# Patient Record
Sex: FEMALE | Race: WHITE | NOT HISPANIC OR LATINO | Employment: FULL TIME | URBAN - METROPOLITAN AREA
[De-identification: names, ages, dates, MRNs, and addresses within clinical notes are randomized per-mention and may not be internally consistent; named-entity substitution may affect disease eponyms.]

---

## 2019-06-12 ENCOUNTER — OFFICE VISIT (OUTPATIENT)
Dept: FAMILY MEDICINE CLINIC | Facility: CLINIC | Age: 19
End: 2019-06-12

## 2019-06-12 VITALS
RESPIRATION RATE: 18 BRPM | HEIGHT: 67 IN | SYSTOLIC BLOOD PRESSURE: 140 MMHG | OXYGEN SATURATION: 98 % | WEIGHT: 280.4 LBS | DIASTOLIC BLOOD PRESSURE: 90 MMHG | HEART RATE: 85 BPM | TEMPERATURE: 96.7 F | BODY MASS INDEX: 44.01 KG/M2

## 2019-06-12 DIAGNOSIS — Z00.00 ENCOUNTER FOR MEDICAL EXAMINATION TO ESTABLISH CARE: Primary | ICD-10-CM

## 2019-06-12 DIAGNOSIS — R63.5 WEIGHT GAIN: ICD-10-CM

## 2019-06-12 DIAGNOSIS — E28.2 PCOS (POLYCYSTIC OVARIAN SYNDROME): ICD-10-CM

## 2019-06-12 DIAGNOSIS — J03.90 TONSILLITIS: ICD-10-CM

## 2019-06-12 DIAGNOSIS — J30.2 SEASONAL ALLERGIES: ICD-10-CM

## 2019-06-12 PROBLEM — E66.01 MORBID OBESITY WITH BMI OF 40.0-44.9, ADULT (HCC): Status: ACTIVE | Noted: 2019-06-12

## 2019-06-12 PROBLEM — I10 ESSENTIAL HYPERTENSION: Status: ACTIVE | Noted: 2019-06-12

## 2019-06-12 LAB
HBA1C MFR BLD: 5.5 %
Lab: ABNORMAL

## 2019-06-12 PROCEDURE — 83036 HEMOGLOBIN GLYCOSYLATED A1C: CPT | Performed by: NURSE PRACTITIONER

## 2019-06-12 PROCEDURE — 99204 OFFICE O/P NEW MOD 45 MIN: CPT | Performed by: NURSE PRACTITIONER

## 2019-06-12 RX ORDER — DESOGESTREL AND ETHINYL ESTRADIOL 0.15-0.03
KIT ORAL
Refills: 12 | COMMUNITY
Start: 2019-03-25 | End: 2021-06-25

## 2019-06-12 RX ORDER — CETIRIZINE HYDROCHLORIDE 10 MG/1
10 TABLET ORAL DAILY
Qty: 30 TABLET | Refills: 5 | OUTPATIENT
Start: 2019-06-12 | End: 2021-06-25

## 2019-06-12 RX ORDER — DOXYCYCLINE HYCLATE 100 MG
100 TABLET ORAL 2 TIMES DAILY
Qty: 20 TABLET | Refills: 0 | Status: SHIPPED | OUTPATIENT
Start: 2019-06-12 | End: 2019-06-22

## 2019-06-12 NOTE — PROGRESS NOTES
Britta Taylor is a 19 y.o. female who presents today to establish care and evaluate uri symptoms.    Chief Complaint   Patient presents with   • Establish Care       Patient states that she has had uri symptoms for over a week. She has a sore throat, sinus congestion/drainage, ear pain, cough, and fever up to 101. She has taken OTC meds such as Airborne and Dayquil with minimal relief. She also has PCOS which is not really being treated except at [facility] where she gets her OCP's. She is sexually active and in a monogamous relationship with a long term boyfriend. She does like the APRI and states that it has helped regulate her menses and cut down on the pain and cramping. She notes her BP is elevated today and it has been elevated in the past as well. She has never taken any medication for hypertension but has gained weight over the past couple of years.      The following portions of the patient's history were reviewed and updated as appropriate: allergies, current medications, past family history, past medical history, past social history, past surgical history and problem list.     Past Medical History:   Diagnosis Date   • PCOS (polycystic ovarian syndrome)        History reviewed. No pertinent surgical history.    History reviewed. No pertinent family history.    Social History     Socioeconomic History   • Marital status: Single     Spouse name: Not on file   • Number of children: Not on file   • Years of education: Not on file   • Highest education level: Not on file   Tobacco Use   • Smoking status: Never Smoker   • Smokeless tobacco: Never Used   Substance and Sexual Activity   • Alcohol use: No     Frequency: Never   • Drug use: No   • Sexual activity: Yes     Partners: Male     Birth control/protection: OCP       Allergies   Allergen Reactions   • Amoxicillin Rash         Current Outpatient Medications:   •  APRI 0.15-30 MG-MCG per tablet, , Disp: , Rfl: 12  •  cetirizine (zyrTEC) 10 MG tablet,  "Take 1 tablet by mouth Daily., Disp: 30 tablet, Rfl: 5  •  doxycycline (VIBRAMYICN) 100 MG tablet, Take 1 tablet by mouth 2 (Two) Times a Day for 10 days., Disp: 20 tablet, Rfl: 0    Health Maintenance   Topic Date Due   • ANNUAL PHYSICAL  01/19/2002   • HPV VACCINES (1 - Female 3-dose series) 01/19/2014   • MENINGOCOCCAL VACCINE (Normal Risk) (1 - 2-dose series) 01/19/2015   • TDAP/TD VACCINES (1 - Tdap) 01/19/2018   • CHLAMYDIA SCREENING  06/12/2019   • INFLUENZA VACCINE  08/01/2019        ROS    Review of Systems   Constitutional: Positive for unexpected weight gain.   HENT: Positive for congestion, ear pain, postnasal drip, rhinorrhea and sore throat.    Respiratory: Positive for cough.    All other systems reviewed and are negative.      Visit Vitals  /90   Pulse 85   Temp 96.7 °F (35.9 °C) (Temporal)   Resp 18   Ht 170.2 cm (67\")   Wt 127 kg (280 lb 6.4 oz)   LMP 06/03/2019   SpO2 98%   BMI 43.92 kg/m²       Physical Exam     Physical Exam   Constitutional: She is oriented to person, place, and time. She appears well-developed. She is morbidly obese.  HENT:   Head: Normocephalic and atraumatic.   Right Ear: A middle ear effusion is present.   Left Ear: A middle ear effusion is present.   Nose: Mucosal edema, rhinorrhea and congestion present.   Mouth/Throat: Posterior oropharyngeal edema and posterior oropharyngeal erythema present. Tonsils are 2+ on the right. Tonsils are 2+ on the left.   Cardiovascular: Normal rate, regular rhythm and normal heart sounds. Exam reveals no gallop and no friction rub.   No murmur heard.  Pulmonary/Chest: Effort normal and breath sounds normal. No stridor. No respiratory distress. She has no wheezes. She has no rales.   Neurological: She is alert and oriented to person, place, and time.   Skin: Skin is warm and dry.   Psychiatric: She has a normal mood and affect. Her behavior is normal. Judgment and thought content normal.   Nursing note and vitals " reviewed.      Assessment/Plan    Problem List Items Addressed This Visit        Endocrine    PCOS (polycystic ovarian syndrome)    Relevant Orders    CBC Auto Differential    Comprehensive Metabolic Panel    TSH    Lipid Panel With / Chol / HDL Ratio    Vitamin D 1,25 Dihydroxy    Vitamin B12       Other    Seasonal allergies    Relevant Medications    cetirizine (zyrTEC) 10 MG tablet      Other Visit Diagnoses     Encounter for medical examination to establish care    -  Primary    Tonsillitis        Relevant Medications    doxycycline (VIBRAMYICN) 100 MG tablet    Weight gain        Relevant Orders    POC Glycosylated Hemoglobin (Hb A1C) (Completed)    CBC Auto Differential    Comprehensive Metabolic Panel    TSH    Lipid Panel With / Chol / HDL Ratio    Vitamin D 1,25 Dihydroxy    Vitamin B12        Hemoglobin A1c is 5.5 today.     Patient will have fasting labs drawn at [facility], which is her place of employment, then she will return for a comprehensive physical. We will address the issues of her hypertension and PCOS at that time.     Patient will return in approximately 2 weeks for a complete physical and to review laboratory results.    YAKELIN Dutton

## 2019-06-17 ENCOUNTER — LAB (OUTPATIENT)
Dept: LAB | Facility: HOSPITAL | Age: 19
End: 2019-06-17

## 2019-06-17 DIAGNOSIS — R63.5 WEIGHT GAIN: ICD-10-CM

## 2019-06-17 DIAGNOSIS — E28.2 PCOS (POLYCYSTIC OVARIAN SYNDROME): ICD-10-CM

## 2019-06-17 LAB
ALBUMIN SERPL-MCNC: 4.5 G/DL (ref 3.5–5.2)
ALBUMIN/GLOB SERPL: 1.3 G/DL
ALP SERPL-CCNC: 57 U/L (ref 39–117)
ALT SERPL W P-5'-P-CCNC: 21 U/L (ref 1–33)
ANION GAP SERPL CALCULATED.3IONS-SCNC: 12.8 MMOL/L
AST SERPL-CCNC: 15 U/L (ref 1–32)
BASOPHILS # BLD AUTO: 0.04 10*3/MM3 (ref 0–0.2)
BASOPHILS NFR BLD AUTO: 0.4 % (ref 0–1.5)
BILIRUB SERPL-MCNC: 0.3 MG/DL (ref 0.2–1.2)
BUN BLD-MCNC: 13 MG/DL (ref 6–20)
BUN/CREAT SERPL: 19.4 (ref 7–25)
CALCIUM SPEC-SCNC: 10.5 MG/DL (ref 8.6–10.5)
CHLORIDE SERPL-SCNC: 100 MMOL/L (ref 98–107)
CHOLEST SERPL-MCNC: 220 MG/DL (ref 0–200)
CO2 SERPL-SCNC: 27.2 MMOL/L (ref 22–29)
CREAT BLD-MCNC: 0.67 MG/DL (ref 0.57–1)
DEPRECATED RDW RBC AUTO: 40.3 FL (ref 37–54)
EOSINOPHIL # BLD AUTO: 0.07 10*3/MM3 (ref 0–0.4)
EOSINOPHIL NFR BLD AUTO: 0.7 % (ref 0.3–6.2)
ERYTHROCYTE [DISTWIDTH] IN BLOOD BY AUTOMATED COUNT: 12.3 % (ref 12.3–15.4)
GFR SERPL CREATININE-BSD FRML MDRD: 112 ML/MIN/1.73
GLOBULIN UR ELPH-MCNC: 3.5 GM/DL
GLUCOSE BLD-MCNC: 84 MG/DL (ref 65–99)
HCT VFR BLD AUTO: 43.4 % (ref 34–46.6)
HDLC SERPL QL: 4.07
HDLC SERPL-MCNC: 54 MG/DL (ref 40–60)
HGB BLD-MCNC: 13.6 G/DL (ref 12–15.9)
IMM GRANULOCYTES # BLD AUTO: 0.04 10*3/MM3 (ref 0–0.05)
IMM GRANULOCYTES NFR BLD AUTO: 0.4 % (ref 0–0.5)
LDLC SERPL CALC-MCNC: 141 MG/DL (ref 0–100)
LYMPHOCYTES # BLD AUTO: 3.24 10*3/MM3 (ref 0.7–3.1)
LYMPHOCYTES NFR BLD AUTO: 30.1 % (ref 19.6–45.3)
MCH RBC QN AUTO: 28.2 PG (ref 26.6–33)
MCHC RBC AUTO-ENTMCNC: 31.3 G/DL (ref 31.5–35.7)
MCV RBC AUTO: 89.9 FL (ref 79–97)
MONOCYTES # BLD AUTO: 0.49 10*3/MM3 (ref 0.1–0.9)
MONOCYTES NFR BLD AUTO: 4.6 % (ref 5–12)
NEUTROPHILS # BLD AUTO: 6.87 10*3/MM3 (ref 1.7–7)
NEUTROPHILS NFR BLD AUTO: 63.8 % (ref 42.7–76)
NRBC BLD AUTO-RTO: 0 /100 WBC (ref 0–0.2)
PLATELET # BLD AUTO: 354 10*3/MM3 (ref 140–450)
PMV BLD AUTO: 9.8 FL (ref 6–12)
POTASSIUM BLD-SCNC: 4.3 MMOL/L (ref 3.5–5.2)
PROT SERPL-MCNC: 8 G/DL (ref 6–8.5)
RBC # BLD AUTO: 4.83 10*6/MM3 (ref 3.77–5.28)
SODIUM BLD-SCNC: 140 MMOL/L (ref 136–145)
TRIGL SERPL-MCNC: 123 MG/DL (ref 0–150)
TSH SERPL DL<=0.05 MIU/L-ACNC: 2 MIU/ML (ref 0.27–4.2)
VIT B12 BLD-MCNC: 567 PG/ML (ref 211–946)
VLDLC SERPL-MCNC: 24.6 MG/DL (ref 5–40)
WBC NRBC COR # BLD: 10.75 10*3/MM3 (ref 3.4–10.8)

## 2019-06-17 PROCEDURE — 82607 VITAMIN B-12: CPT

## 2019-06-17 PROCEDURE — 80053 COMPREHEN METABOLIC PANEL: CPT

## 2019-06-17 PROCEDURE — 85025 COMPLETE CBC W/AUTO DIFF WBC: CPT

## 2019-06-17 PROCEDURE — 84443 ASSAY THYROID STIM HORMONE: CPT

## 2019-06-17 PROCEDURE — 80061 LIPID PANEL: CPT

## 2019-06-17 PROCEDURE — 82652 VIT D 1 25-DIHYDROXY: CPT

## 2019-06-17 PROCEDURE — 36415 COLL VENOUS BLD VENIPUNCTURE: CPT

## 2019-06-19 LAB — 1,25(OH)2D3 SERPL-MCNC: 49.6 PG/ML (ref 19.9–79.3)

## 2019-06-26 ENCOUNTER — OFFICE VISIT (OUTPATIENT)
Dept: FAMILY MEDICINE CLINIC | Facility: CLINIC | Age: 19
End: 2019-06-26

## 2019-06-26 VITALS
DIASTOLIC BLOOD PRESSURE: 90 MMHG | SYSTOLIC BLOOD PRESSURE: 130 MMHG | RESPIRATION RATE: 18 BRPM | HEIGHT: 67 IN | TEMPERATURE: 97 F | HEART RATE: 101 BPM | BODY MASS INDEX: 44.04 KG/M2 | OXYGEN SATURATION: 98 % | WEIGHT: 280.6 LBS

## 2019-06-26 DIAGNOSIS — I10 ESSENTIAL HYPERTENSION: ICD-10-CM

## 2019-06-26 DIAGNOSIS — Z00.00 ANNUAL PHYSICAL EXAM: Primary | ICD-10-CM

## 2019-06-26 DIAGNOSIS — Z23 NEED FOR TETANUS BOOSTER: ICD-10-CM

## 2019-06-26 DIAGNOSIS — E28.2 PCOS (POLYCYSTIC OVARIAN SYNDROME): ICD-10-CM

## 2019-06-26 LAB
BILIRUB BLD-MCNC: NEGATIVE MG/DL
CLARITY, POC: CLEAR
COLOR UR: YELLOW
GLUCOSE UR STRIP-MCNC: NEGATIVE MG/DL
KETONES UR QL: ABNORMAL
LEUKOCYTE EST, POC: NEGATIVE
NITRITE UR-MCNC: NEGATIVE MG/ML
PH UR: 5.5 [PH] (ref 5–8)
PROT UR STRIP-MCNC: NEGATIVE MG/DL
RBC # UR STRIP: NEGATIVE /UL
SP GR UR: 1.02 (ref 1–1.03)
UROBILINOGEN UR QL: NORMAL

## 2019-06-26 PROCEDURE — 81003 URINALYSIS AUTO W/O SCOPE: CPT | Performed by: NURSE PRACTITIONER

## 2019-06-26 PROCEDURE — 99395 PREV VISIT EST AGE 18-39: CPT | Performed by: NURSE PRACTITIONER

## 2019-06-26 PROCEDURE — 90471 IMMUNIZATION ADMIN: CPT | Performed by: NURSE PRACTITIONER

## 2019-06-26 PROCEDURE — 90715 TDAP VACCINE 7 YRS/> IM: CPT | Performed by: NURSE PRACTITIONER

## 2019-06-26 RX ORDER — LISINOPRIL 10 MG/1
10 TABLET ORAL DAILY
Qty: 30 TABLET | Refills: 2 | Status: SHIPPED | OUTPATIENT
Start: 2019-06-26 | End: 2019-09-12 | Stop reason: SDUPTHER

## 2019-06-26 NOTE — PROGRESS NOTES
Britta Kerr is a 19 y.o. female who presents for comprehensive examination.     Chief Complaint   Patient presents with   • Annual Exam       HPI     Past Medical History:   Diagnosis Date   • PCOS (polycystic ovarian syndrome)        History reviewed. No pertinent surgical history.    Family History   Problem Relation Age of Onset   • No Known Problems Mother    • Heart attack Father    • Colon cancer Father    • No Known Problems Sister    • No Known Problems Brother    • GI problems Maternal Grandmother         bowel obstruction   • Diabetes Maternal Grandfather    • Heart disease Paternal Grandmother    • Diabetes Paternal Grandfather    • Cancer Paternal Grandfather    • No Known Problems Brother    • No Known Problems Sister        Social History     Socioeconomic History   • Marital status: Single     Spouse name: Not on file   • Number of children: Not on file   • Years of education: Not on file   • Highest education level: Not on file   Tobacco Use   • Smoking status: Never Smoker   • Smokeless tobacco: Never Used   Substance and Sexual Activity   • Alcohol use: No     Frequency: Never   • Drug use: No   • Sexual activity: Yes     Partners: Male     Birth control/protection: OCP       Allergies   Allergen Reactions   • Amoxicillin Rash       History of abnormal Pap smear: not had yet; last performed not had yet  History of abnormal mammogram: N/A; last performed never had one  History of Osteoporosis: no; last DEXA scan not had one  History of abnormal lipids: Yes  History of Vitamin B-12 deficiency : no  History of D deficiency: no  History of cardiovascular disease:  no  History of hypertension: yes - will start on medication  Family history of uterine or ovarian cancer: no  Family History of colon cancer/colon polyps: yes - father  Family history of breast cancer: no  Received Gardasil immunization: yes - as adolescent  Current contraception: OCP (estrogen/progesterone)  History of STD: no    Health  "Maintenance   Topic Date Due   • TDAP/TD VACCINES (1 - Tdap) 01/19/2018   • CHLAMYDIA SCREENING  06/12/2019   • INFLUENZA VACCINE  08/01/2019   • ANNUAL PHYSICAL  06/27/2020   • MENINGOCOCCAL VACCINE (Normal Risk)  Discontinued   • HPV VACCINES  Discontinued        ROS    Review of Systems   Constitutional: Positive for fatigue.   Gastrointestinal: Positive for diarrhea.   Allergic/Immunologic: Positive for environmental allergies.   Psychiatric/Behavioral: Positive for stress. The patient is nervous/anxious.    All other systems reviewed and are negative.      Visit Vitals  /90   Pulse 101   Temp 97 °F (36.1 °C) (Temporal)   Resp 18   Ht 170.2 cm (67\")   Wt 127 kg (280 lb 9.6 oz)   LMP 06/03/2019   SpO2 98%   BMI 43.95 kg/m²         Current Outpatient Medications:   •  APRI 0.15-30 MG-MCG per tablet, , Disp: , Rfl: 12  •  cetirizine (zyrTEC) 10 MG tablet, Take 1 tablet by mouth Daily., Disp: 30 tablet, Rfl: 5  •  lisinopril (PRINIVIL,ZESTRIL) 10 MG tablet, Take 1 tablet by mouth Daily., Disp: 30 tablet, Rfl: 2  •  metFORMIN (GLUCOPHAGE) 500 MG tablet, Take 1 tablet by mouth Daily With Breakfast., Disp: 30 tablet, Rfl: 2     PE    Physical Exam   Constitutional: She is oriented to person, place, and time. She appears well-developed.   HENT:   Head: Normocephalic and atraumatic.   Right Ear: External ear normal.   Left Ear: External ear normal.   Nose: Nose normal.   Mouth/Throat: Oropharynx is clear and moist.   Eyes: Conjunctivae and EOM are normal. Pupils are equal, round, and reactive to light.   Neck: Normal range of motion. Neck supple. No JVD present. No tracheal deviation present. No thyromegaly present.   Cardiovascular: Normal rate, regular rhythm, normal heart sounds and intact distal pulses. Exam reveals no gallop and no friction rub.   No murmur heard.  Pulmonary/Chest: Effort normal and breath sounds normal. No stridor. No respiratory distress. She has no wheezes. She has no rales. She exhibits no " tenderness.   Abdominal: Soft. Bowel sounds are normal. She exhibits no distension and no mass. There is no tenderness. There is no rebound and no guarding. No hernia.   Musculoskeletal: Normal range of motion.   Lymphadenopathy:     She has no cervical adenopathy.   Neurological: She is alert and oriented to person, place, and time. She has normal strength.   Skin: Skin is warm and dry. Capillary refill takes less than 2 seconds.   Psychiatric: She has a normal mood and affect. Her behavior is normal. Judgment and thought content normal.   Nursing note and vitals reviewed.      A/P    Problem List Items Addressed This Visit        Cardiovascular and Mediastinum    Essential hypertension    Relevant Medications    lisinopril (PRINIVIL,ZESTRIL) 10 MG tablet       Endocrine    PCOS (polycystic ovarian syndrome)    Relevant Medications    metFORMIN (GLUCOPHAGE) 500 MG tablet      Other Visit Diagnoses     Annual physical exam    -  Primary    Relevant Orders    POCT urinalysis dipstick, automated (Completed)    Chlamydia trachomatis, Neisseria gonorrhoeae, PCR - Urine, Urine, Clean Catch    Need for tetanus booster        Relevant Orders    Tdap Vaccine Greater Than or Equal To 6yo IM (Completed)      Discussed wellness and prevention including weight loss, healthy diet, exercise, immunizations and preventative screenings. Handout given to patient regarding education on cholesterol and how to lower it with diet and exercise.    Plan of care reviewed with patient at the conclusion of today's visit. Education was provided in regards to diagnosis, diet and exercise, cervical cancer screening, breast cancer screening and the importance of yearly mammograms. Management and any prescribed or recommended OTC medications.  Patient verbalizes understanding of and agreement with management plan.    Patient will return in 3 months for recheck or sooner for any problems or concerns.    YAKELIN Dutton

## 2019-09-12 ENCOUNTER — OFFICE VISIT (OUTPATIENT)
Dept: FAMILY MEDICINE CLINIC | Facility: CLINIC | Age: 19
End: 2019-09-12

## 2019-09-12 VITALS
RESPIRATION RATE: 18 BRPM | WEIGHT: 282.6 LBS | TEMPERATURE: 96.4 F | SYSTOLIC BLOOD PRESSURE: 132 MMHG | OXYGEN SATURATION: 97 % | BODY MASS INDEX: 44.36 KG/M2 | HEIGHT: 67 IN | HEART RATE: 91 BPM | DIASTOLIC BLOOD PRESSURE: 70 MMHG

## 2019-09-12 DIAGNOSIS — E28.2 PCOS (POLYCYSTIC OVARIAN SYNDROME): ICD-10-CM

## 2019-09-12 DIAGNOSIS — I10 ESSENTIAL HYPERTENSION: Primary | ICD-10-CM

## 2019-09-12 LAB
HBA1C MFR BLD: 5.1 %
Lab: NORMAL

## 2019-09-12 PROCEDURE — 99213 OFFICE O/P EST LOW 20 MIN: CPT | Performed by: NURSE PRACTITIONER

## 2019-09-12 PROCEDURE — 83036 HEMOGLOBIN GLYCOSYLATED A1C: CPT | Performed by: NURSE PRACTITIONER

## 2019-09-12 RX ORDER — LISINOPRIL 10 MG/1
10 TABLET ORAL DAILY
Qty: 30 TABLET | Refills: 2 | Status: SHIPPED | OUTPATIENT
Start: 2019-09-12 | End: 2020-03-12 | Stop reason: SDUPTHER

## 2019-09-12 NOTE — PROGRESS NOTES
"Britta Kerr is a 19 y.o. female who presents for follow up of diabetes and hypertension.    Chief Complaint   Patient presents with   • Diabetes   • Hypertension       Patient states that she has been doing well on the Metformin. Now that she is on birth control pills her menses are better and not nearly as heavy. She states that the first week on the Metformin she had some gastric issues but now she has no problems with it. She had been doing better on her diet but she just recently moved and they have been eating out a lot. She now is living in a place with a backyard and she is hoping to increase her exercise as well. She is interested in going to see the nutritionist and she will call when her schedule permits it.          Allergies   Allergen Reactions   • Amoxicillin Rash       Health Maintenance   Topic Date Due   • CHLAMYDIA SCREENING  06/12/2019   • INFLUENZA VACCINE  08/01/2019   • ANNUAL PHYSICAL  06/27/2020   • TDAP/TD VACCINES (2 - Td) 06/26/2029   • MENINGOCOCCAL VACCINE (Normal Risk)  Discontinued   • HPV VACCINES  Discontinued        ROS    Review of Systems   Constitutional: Positive for fatigue and unexpected weight gain.   Genitourinary: Positive for menstrual problem.   All other systems reviewed and are negative.      Vitals:    09/12/19 1450   BP: 132/70   Pulse: 91   Resp: 18   Temp: 96.4 °F (35.8 °C)   TempSrc: Temporal   SpO2: 97%   Weight: 128 kg (282 lb 9.6 oz)   Height: 170.2 cm (67\")   PainSc: 0-No pain         Current Outpatient Medications:   •  cetirizine (zyrTEC) 10 MG tablet, Take 1 tablet by mouth Daily., Disp: 30 tablet, Rfl: 5  •  lisinopril (PRINIVIL,ZESTRIL) 10 MG tablet, Take 1 tablet by mouth Daily., Disp: 30 tablet, Rfl: 2  •  metFORMIN (GLUCOPHAGE) 500 MG tablet, Take 1 tablet by mouth Daily With Breakfast., Disp: 30 tablet, Rfl: 2  •  APRI 0.15-30 MG-MCG per tablet, , Disp: , Rfl: 12    PE    Physical Exam   Constitutional: She is oriented to person, place, and time. She " appears well-developed. She is morbidly obese.  HENT:   Head: Normocephalic and atraumatic.   Cardiovascular: Normal rate, regular rhythm and normal heart sounds. Exam reveals no gallop and no friction rub.   No murmur heard.  Pulmonary/Chest: Breath sounds normal. No stridor. No respiratory distress. She has no wheezes. She has no rales.   Neurological: She is alert and oriented to person, place, and time.   Skin: Skin is warm and dry.   Psychiatric: She has a normal mood and affect. Her behavior is normal. Judgment and thought content normal.   Nursing note and vitals reviewed.       A/P    Problem List Items Addressed This Visit        Cardiovascular and Mediastinum    Essential hypertension - Primary    Relevant Medications    lisinopril (PRINIVIL,ZESTRIL) 10 MG tablet       Endocrine    PCOS (polycystic ovarian syndrome)    Relevant Medications    metFORMIN (GLUCOPHAGE) 500 MG tablet    Other Relevant Orders    POC Glycosylated Hemoglobin (Hb A1C) (Completed)          Plan of care reviewed with patient at the conclusion of today's visit. Education was provided regarding diagnosis, management and any prescribed or recommended OTC medications.  Patient verbalizes understanding of and agreement with management plan.    Return in about 6 months (around 3/12/2020) for Recheck.     YAKELIN Dutton

## 2020-03-12 ENCOUNTER — OFFICE VISIT (OUTPATIENT)
Dept: FAMILY MEDICINE CLINIC | Facility: CLINIC | Age: 20
End: 2020-03-12

## 2020-03-12 VITALS
OXYGEN SATURATION: 98 % | DIASTOLIC BLOOD PRESSURE: 86 MMHG | HEIGHT: 67 IN | HEART RATE: 92 BPM | SYSTOLIC BLOOD PRESSURE: 130 MMHG | WEIGHT: 280 LBS | BODY MASS INDEX: 43.95 KG/M2 | RESPIRATION RATE: 18 BRPM | TEMPERATURE: 98.3 F

## 2020-03-12 DIAGNOSIS — I10 ESSENTIAL HYPERTENSION: Primary | ICD-10-CM

## 2020-03-12 DIAGNOSIS — E66.01 MORBID OBESITY WITH BMI OF 40.0-44.9, ADULT (HCC): ICD-10-CM

## 2020-03-12 DIAGNOSIS — E28.2 PCOS (POLYCYSTIC OVARIAN SYNDROME): ICD-10-CM

## 2020-03-12 PROCEDURE — 99214 OFFICE O/P EST MOD 30 MIN: CPT | Performed by: NURSE PRACTITIONER

## 2020-03-12 RX ORDER — LISINOPRIL 10 MG/1
10 TABLET ORAL DAILY
Qty: 30 TABLET | Refills: 5 | OUTPATIENT
Start: 2020-03-12 | End: 2021-04-28

## 2020-03-12 NOTE — PROGRESS NOTES
"Britta Kerr is a 20 y.o. female who presents for follow up of hypertension, PCOS, and obesity.    Chief Complaint   Patient presents with   • Med Refill       Patient saw her GYN last week and they recommended that we might increase her dose of Metformin to try to help her lose weight. She is doing fine with it and only has GI issues when she forgets to eat in the morning.     Hypertension   This is a recurrent problem. The current episode started more than 1 month ago. The problem has been gradually improving since onset. There are no associated agents to hypertension. Risk factors for coronary artery disease include family history, obesity, sedentary lifestyle and stress. Past treatments include ACE inhibitors. Current antihypertension treatment includes ACE inhibitors. The current treatment provides moderate improvement. Compliance problems include exercise, diet and psychosocial issues.    Obesity   This is a chronic problem. The current episode started more than 1 year ago. The problem occurs constantly. The problem has been unchanged. Associated symptoms include fatigue. The symptoms are aggravated by drinking, eating and stress. Treatments tried: Just started Weight Watchers.         Allergies   Allergen Reactions   • Amoxicillin Rash       Health Maintenance   Topic Date Due   • HEPATITIS C SCREENING  06/12/2019   • CHLAMYDIA SCREENING  06/12/2019   • INFLUENZA VACCINE  08/01/2019   • PAP SMEAR  01/19/2020   • ANNUAL PHYSICAL  06/27/2020   • TDAP/TD VACCINES (2 - Td) 06/26/2029   • MENINGOCOCCAL VACCINE (Normal Risk)  Discontinued   • HPV VACCINES  Discontinued        ROS    Review of Systems   Constitutional: Positive for fatigue.   Genitourinary: Positive for menstrual problem.   Psychiatric/Behavioral: Positive for stress.       Vitals:    03/12/20 1514   BP: 130/86   Pulse: 92   Resp: 18   Temp: 98.3 °F (36.8 °C)   SpO2: 98%   Weight: 127 kg (280 lb)   Height: 170.2 cm (67\")         Current Outpatient " Medications:   •  APRI 0.15-30 MG-MCG per tablet, , Disp: , Rfl: 12  •  cetirizine (zyrTEC) 10 MG tablet, Take 1 tablet by mouth Daily., Disp: 30 tablet, Rfl: 5  •  lisinopril (PRINIVIL,ZESTRIL) 10 MG tablet, Take 1 tablet by mouth Daily., Disp: 30 tablet, Rfl: 5  •  metFORMIN (GLUCOPHAGE) 500 MG tablet, Take 1 tablet by mouth 2 (Two) Times a Day With Meals., Disp: 60 tablet, Rfl: 5    PE    Physical Exam   Constitutional: She is oriented to person, place, and time. She appears well-developed. She is morbidly obese.  HENT:   Head: Normocephalic and atraumatic.   Cardiovascular: Normal rate, regular rhythm and normal heart sounds. Exam reveals no gallop and no friction rub.   No murmur heard.  Pulmonary/Chest: Effort normal and breath sounds normal. No stridor. No respiratory distress. She has no wheezes. She has no rales. She exhibits no tenderness.   Neurological: She is alert and oriented to person, place, and time.   Skin: Skin is warm and dry.   Psychiatric: She has a normal mood and affect. Her behavior is normal. Judgment and thought content normal.   Nursing note and vitals reviewed.       A/P    Problem List Items Addressed This Visit        Cardiovascular and Mediastinum    Essential hypertension - Primary    Relevant Medications    lisinopril (PRINIVIL,ZESTRIL) 10 MG tablet       Digestive    Morbid obesity with BMI of 40.0-44.9, adult (CMS/[facility])       Endocrine    PCOS (polycystic ovarian syndrome)    Relevant Medications    metFORMIN (GLUCOPHAGE) 500 MG tablet          Plan of care reviewed with patient at the conclusion of today's visit. Education was provided regarding diagnosis, management and any prescribed or recommended OTC medications.  Patient verbalizes understanding of and agreement with management plan.    Return in about 6 months (around 9/12/2020) for Recheck.     YAKELIN Dutton

## 2021-01-20 ENCOUNTER — IMMUNIZATION (OUTPATIENT)
Dept: VACCINE CLINIC | Facility: HOSPITAL | Age: 21
End: 2021-01-20

## 2021-01-20 PROCEDURE — 0001A: CPT | Performed by: INTERNAL MEDICINE

## 2021-01-20 PROCEDURE — 91300 HC SARSCOV02 VAC 30MCG/0.3ML IM: CPT | Performed by: INTERNAL MEDICINE

## 2021-02-10 ENCOUNTER — IMMUNIZATION (OUTPATIENT)
Dept: VACCINE CLINIC | Facility: HOSPITAL | Age: 21
End: 2021-02-10

## 2021-02-10 PROCEDURE — 0002A: CPT | Performed by: INTERNAL MEDICINE

## 2021-02-10 PROCEDURE — 91300 HC SARSCOV02 VAC 30MCG/0.3ML IM: CPT | Performed by: INTERNAL MEDICINE

## 2021-04-28 PROCEDURE — U0004 COV-19 TEST NON-CDC HGH THRU: HCPCS | Performed by: FAMILY MEDICINE

## 2021-04-29 ENCOUNTER — TELEPHONE (OUTPATIENT)
Dept: URGENT CARE | Facility: CLINIC | Age: 21
End: 2021-04-29

## 2021-04-29 NOTE — TELEPHONE ENCOUNTER
----- Message from Nirmal Reyes MD sent at 4/29/2021  1:31 PM EDT -----  Please inform patient of negative lab result    ----- Message -----  From: Lab, Background User  Sent: 4/29/2021  12:55 PM EDT  To:  [facility] Covid Results

## 2021-05-06 ENCOUNTER — E-VISIT (OUTPATIENT)
Dept: FAMILY MEDICINE CLINIC | Facility: TELEHEALTH | Age: 21
End: 2021-05-06

## 2021-05-06 DIAGNOSIS — J20.9 ACUTE BRONCHITIS, UNSPECIFIED ORGANISM: Primary | ICD-10-CM

## 2021-05-06 PROCEDURE — 99422 OL DIG E/M SVC 11-20 MIN: CPT | Performed by: NURSE PRACTITIONER

## 2021-05-06 RX ORDER — FLUTICASONE PROPIONATE 50 MCG
2 SPRAY, SUSPENSION (ML) NASAL DAILY
Qty: 18.2 ML | Refills: 0 | OUTPATIENT
Start: 2021-05-06 | End: 2021-06-25

## 2021-05-06 RX ORDER — GUAIFENESIN/DEXTROMETHORPHAN 100-10MG/5
10 SYRUP ORAL 3 TIMES DAILY PRN
Qty: 118 ML | Refills: 0 | OUTPATIENT
Start: 2021-05-06 | End: 2021-06-25

## 2021-05-06 RX ORDER — DOXYCYCLINE HYCLATE 100 MG/1
100 CAPSULE ORAL 2 TIMES DAILY
Qty: 20 CAPSULE | Refills: 0 | Status: SHIPPED | OUTPATIENT
Start: 2021-05-06 | End: 2021-05-16

## 2021-05-06 NOTE — PATIENT INSTRUCTIONS
Drink plenty of fluids   If symptoms do not improve in 3-5 days follow up at urgent care  If you become short of breath or develop chest pain go to the emergency room immediately       Sinusitis, Adult  Sinusitis is soreness and swelling (inflammation) of your sinuses. Sinuses are hollow spaces in the bones around your face. They are located:  · Around your eyes.  · In the middle of your forehead.  · Behind your nose.  · In your cheekbones.  Your sinuses and nasal passages are lined with a fluid called mucus. Mucus drains out of your sinuses. Swelling can trap mucus in your sinuses. This lets germs (bacteria, virus, or fungus) grow, which leads to infection. Most of the time, this condition is caused by a virus.  What are the causes?  This condition is caused by:  · Allergies.  · Asthma.  · Germs.  · Things that block your nose or sinuses.  · Growths in the nose (nasal polyps).  · Chemicals or irritants in the air.  · Fungus (rare).  What increases the risk?  You are more likely to develop this condition if:  · You have a weak body defense system (immune system).  · You do a lot of swimming or diving.  · You use nasal sprays too much.  · You smoke.  What are the signs or symptoms?  The main symptoms of this condition are pain and a feeling of pressure around the sinuses. Other symptoms include:  · Stuffy nose (congestion).  · Runny nose (drainage).  · Swelling and warmth in the sinuses.  · Headache.  · Toothache.  · A cough that may get worse at night.  · Mucus that collects in the throat or the back of the nose (postnasal drip).  · Being unable to smell and taste.  · Being very tired (fatigue).  · A fever.  · Sore throat.  · Bad breath.  How is this diagnosed?  This condition is diagnosed based on:  · Your symptoms.  · Your medical history.  · A physical exam.  · Tests to find out if your condition is short-term (acute) or long-term (chronic). Your doctor may:  ? Check your nose for growths (polyps).  ? Check your  sinuses using a tool that has a light (endoscope).  ? Check for allergies or germs.  ? Do imaging tests, such as an MRI or CT scan.  How is this treated?  Treatment for this condition depends on the cause and whether it is short-term or long-term.  · If caused by a virus, your symptoms should go away on their own within 10 days. You may be given medicines to relieve symptoms. They include:  ? Medicines that shrink swollen tissue in the nose.  ? Medicines that treat allergies (antihistamines).  ? A spray that treats swelling of the nostrils.   ? Rinses that help get rid of thick mucus in your nose (nasal saline washes).  · If caused by bacteria, your doctor may wait to see if you will get better without treatment. You may be given antibiotic medicine if you have:  ? A very bad infection.  ? A weak body defense system.  · If caused by growths in the nose, you may need to have surgery.  Follow these instructions at home:  Medicines  · Take, use, or apply over-the-counter and prescription medicines only as told by your doctor. These may include nasal sprays.  · If you were prescribed an antibiotic medicine, take it as told by your doctor. Do not stop taking the antibiotic even if you start to feel better.  Hydrate and humidify    · Drink enough water to keep your pee (urine) pale yellow.  · Use a cool mist humidifier to keep the humidity level in your home above 50%.  · Breathe in steam for 10-15 minutes, 3-4 times a day, or as told by your doctor. You can do this in the bathroom while a hot shower is running.  · Try not to spend time in cool or dry air.  Rest  · Rest as much as you can.  · Sleep with your head raised (elevated).  · Make sure you get enough sleep each night.  General instructions    · Put a warm, moist washcloth on your face 3-4 times a day, or as often as told by your doctor. This will help with discomfort.  · Wash your hands often with soap and water. If there is no soap and water, use hand  .  · Do not smoke. Avoid being around people who are smoking (secondhand smoke).  · Keep all follow-up visits as told by your doctor. This is important.  Contact a doctor if:  · You have a fever.  · Your symptoms get worse.  · Your symptoms do not get better within 10 days.  Get help right away if:  · You have a very bad headache.  · You cannot stop throwing up (vomiting).  · You have very bad pain or swelling around your face or eyes.  · You have trouble seeing.  · You feel confused.  · Your neck is stiff.  · You have trouble breathing.  Summary  · Sinusitis is swelling of your sinuses. Sinuses are hollow spaces in the bones around your face.  · This condition is caused by tissues in your nose that become inflamed or swollen. This traps germs. These can lead to infection.  · If you were prescribed an antibiotic medicine, take it as told by your doctor. Do not stop taking it even if you start to feel better.  · Keep all follow-up visits as told by your doctor. This is important.  This information is not intended to replace advice given to you by your health care provider. Make sure you discuss any questions you have with your health care provider.  Document Revised: 05/20/2019 Document Reviewed: 05/20/2019  [employer] Patient Education © 2021 [employer]  Acute Bronchitis, Adult    Acute bronchitis is when air tubes in the lungs (bronchi) suddenly get swollen. The condition can make it hard for you to breathe. In adults, acute bronchitis usually goes away within 2 weeks. A cough caused by bronchitis may last up to 3 weeks. Smoking, allergies, and asthma can make the condition worse.  What are the causes?  This condition is caused by:  · Cold and flu viruses. The most common cause of this condition is the virus that causes the common cold.  · Bacteria.  · Substances that irritate the lungs, including:  ? Smoke from cigarettes and other types of tobacco.  ? Dust and pollen.  ? Fumes from chemicals, gases, or  burned fuel.  ? Other materials that pollute indoor or outdoor air.  · Close contact with someone who has acute bronchitis.  What increases the risk?  The following factors may make you more likely to develop this condition:  · A weak body's defense system. This is also called the immune system.  · Any condition that affects your lungs and breathing, such as asthma.  What are the signs or symptoms?  Symptoms of this condition include:  · A cough.  · Coughing up clear, yellow, or green mucus.  · Wheezing.  · Chest congestion.  · Shortness of breath.  · A fever.  · Body aches.  · Chills.  · A sore throat.  How is this treated?  Acute bronchitis may go away over time without treatment. Your doctor may recommend:  · Drinking more fluids.  · Taking a medicine for a fever or cough.  · Using a device that gets medicine into your lungs (inhaler).  · Using a vaporizer or a humidifier. These are machines that add water or moisture in the air to help with coughing and poor breathing.  Follow these instructions at home:    Activity  · Get a lot of rest.  · Avoid places where there are fumes from chemicals.  · Return to your normal activities as told by your doctor. Ask your doctor what activities are safe for you.  Lifestyle  · Drink enough fluids to keep your pee (urine) pale yellow.  · Do not drink alcohol.  · Do not use any products that contain nicotine or tobacco, such as cigarettes, e-cigarettes, and chewing tobacco. If you need help quitting, ask your doctor. Be aware that:  ? Your bronchitis will get worse if you smoke or breathe in other people's smoke (secondhand smoke).  ? Your lungs will heal faster if you quit smoking.  General instructions  · Take over-the-counter and prescription medicines only as told by your doctor.  · Use an inhaler, cool mist vaporizer, or humidifier as told by your doctor.  · Rinse your mouth often with salt water. To make salt water, dissolve ½-1 tsp (3-6 g) of salt in 1 cup (237 mL) of  warm water.  · Keep all follow-up visits as told by your doctor. This is important.  How is this prevented?  To lower your risk of getting this condition again:  · Wash your hands often with soap and water. If soap and water are not available, use hand .  · Avoid contact with people who have cold symptoms.  · Try not to touch your mouth, nose, or eyes with your hands.  · Make sure to get the flu shot every year.  Contact a doctor if:  · Your symptoms do not get better in 2 weeks.  · You vomit more than once or twice.  · You have symptoms of loss of fluid from your body (dehydration). These include:  ? Dark urine.  ? Dry skin or eyes.  ? Increased thirst.  ? Headaches.  ? Confusion.  ? Muscle cramps.  Get help right away if:  · You cough up blood.  · You have chest pain.  · You have very bad shortness of breath.  · You become dehydrated.  · You faint or keep feeling like you are going to faint.  · You keep vomiting.  · You have a very bad headache.  · Your fever or chills get worse.  These symptoms may be an emergency. Do not wait to see if the symptoms will go away. Get medical help right away. Call your local emergency services (911 in the [place] Do not drive yourself to the hospital.  Summary  · Acute bronchitis is when air tubes in the lungs (bronchi) suddenly get swollen. In adults, acute bronchitis usually goes away within 2 weeks.  · Take over-the-counter and prescription medicines only as told by your doctor.  · Drink enough fluid to keep your pee (urine) pale yellow.  · Contact a doctor if your symptoms do not improve after 2 weeks of treatment.  · Get help right away if you cough up blood, faint, or have chest pain or shortness of breath.  This information is not intended to replace advice given to you by your health care provider. Make sure you discuss any questions you have with your health care provider.  Document Revised: 07/10/2020 Document Reviewed: 07/10/2020  [place] Patient Education ©  2021 [employer]

## 2021-05-06 NOTE — PROGRESS NOTES
"Britta Kerr    2000  [id number]    I have reviewed the e-Visit questionnaire and patient's answers, my assessment and plan are as follows:      HPI  Britta Kerr is a 21 y.o. with cough, HA, nasal green/yellow nasal congestion. She has pain in her chest when she coughs, but has not coughed up any mucus. She has tried taking Flonase, [place] gels for sever sinus and congestion. She reports taking a deep breath usually causes her to start coughing \"really bad and it hurts my chest.\"   She was seen on 4/28/2021 at .     Review of Systems - Respiratory ROS: positive for - cough and and hurts chest with coughing   negative for - shortness of breath, wheezing, fever  ENT ROS positive for-HA, nasal congestion and coloed nasal discharge (green or yellow)  Negative-fever       Diagnoses and all orders for this visit:    1. Acute bronchitis, unspecified organism (Primary)    Other orders  -     doxycycline (Vibramycin) 100 MG capsule; Take 1 capsule by mouth 2 (Two) Times a Day for 10 days.  Dispense: 20 capsule; Refill: 0  -     guaiFENesin-dextromethorphan (ROBITUSSIN DM) 100-10 MG/5ML syrup; Take 10 mL by mouth 3 (Three) Times a Day As Needed for Cough.  Dispense: 118 mL; Refill: 0  -     fluticasone (Flonase) 50 MCG/ACT nasal spray; 2 sprays into the nostril(s) as directed by provider Daily.  Dispense: 18.2 mL; Refill: 0    Reviewed labs from 4/28/2021 visit with negative Flu and COVID-19 test     Any medications prescribed have been sent electronically to   [facility] [address] [place] [address] - [address] [facility] - [phone]  - [phone]   [address] [facility] [address]  Phone: [phone] Fax: [phone]      Time Documentation  Counseled patient  Counseling topics: diagnosis, treatment options, follow up plan and return instructions  Total encounter time: counseling time more than 50% of visit: 17      YAKELIN Stark  05/06/21  14:21 EDT        "

## 2021-06-25 ENCOUNTER — APPOINTMENT (OUTPATIENT)
Dept: CT IMAGING | Facility: HOSPITAL | Age: 21
End: 2021-06-25

## 2021-06-25 ENCOUNTER — HOSPITAL ENCOUNTER (EMERGENCY)
Facility: HOSPITAL | Age: 21
Discharge: HOME | End: 2021-06-25
Attending: EMERGENCY MEDICINE | Admitting: EMERGENCY MEDICINE

## 2021-06-25 ENCOUNTER — APPOINTMENT (OUTPATIENT)
Dept: ULTRASOUND IMAGING | Facility: HOSPITAL | Age: 21
End: 2021-06-25

## 2021-06-25 VITALS
WEIGHT: 275 LBS | HEIGHT: 67 IN | DIASTOLIC BLOOD PRESSURE: 52 MMHG | TEMPERATURE: 98.6 F | BODY MASS INDEX: 43.16 KG/M2 | OXYGEN SATURATION: 98 % | SYSTOLIC BLOOD PRESSURE: 135 MMHG | RESPIRATION RATE: 20 BRPM | HEART RATE: 84 BPM

## 2021-06-25 DIAGNOSIS — N83.209 HEMORRHAGIC OVARIAN CYST: Primary | ICD-10-CM

## 2021-06-25 DIAGNOSIS — R10.9 RIGHT LATERAL ABDOMINAL PAIN: ICD-10-CM

## 2021-06-25 LAB
ALBUMIN SERPL-MCNC: 5 G/DL (ref 3.5–5.2)
ALBUMIN/GLOB SERPL: 1.8 G/DL
ALP SERPL-CCNC: 64 U/L (ref 39–117)
ALT SERPL W P-5'-P-CCNC: 23 U/L (ref 1–33)
ANION GAP SERPL CALCULATED.3IONS-SCNC: 9 MMOL/L (ref 5–15)
AST SERPL-CCNC: 20 U/L (ref 1–32)
B-HCG UR QL: NEGATIVE
BASOPHILS # BLD AUTO: 0.03 10*3/MM3 (ref 0–0.2)
BASOPHILS NFR BLD AUTO: 0.2 % (ref 0–1.5)
BILIRUB SERPL-MCNC: 0.3 MG/DL (ref 0–1.2)
BILIRUB UR QL STRIP: NEGATIVE
BUN SERPL-MCNC: 16 MG/DL (ref 6–20)
BUN/CREAT SERPL: 21.1 (ref 7–25)
CALCIUM SPEC-SCNC: 10.2 MG/DL (ref 8.6–10.5)
CHLORIDE SERPL-SCNC: 105 MMOL/L (ref 98–107)
CLARITY UR: CLEAR
CO2 SERPL-SCNC: 27 MMOL/L (ref 22–29)
COLOR UR: YELLOW
CREAT SERPL-MCNC: 0.76 MG/DL (ref 0.57–1)
DEPRECATED RDW RBC AUTO: 42.5 FL (ref 37–54)
EOSINOPHIL # BLD AUTO: 0.03 10*3/MM3 (ref 0–0.4)
EOSINOPHIL NFR BLD AUTO: 0.2 % (ref 0.3–6.2)
ERYTHROCYTE [DISTWIDTH] IN BLOOD BY AUTOMATED COUNT: 12.3 % (ref 12.3–15.4)
GFR SERPL CREATININE-BSD FRML MDRD: 95 ML/MIN/1.73
GLOBULIN UR ELPH-MCNC: 2.8 GM/DL
GLUCOSE SERPL-MCNC: 97 MG/DL (ref 65–99)
GLUCOSE UR STRIP-MCNC: NEGATIVE MG/DL
HCT VFR BLD AUTO: 46.9 % (ref 34–46.6)
HGB BLD-MCNC: 14.7 G/DL (ref 12–15.9)
HGB UR QL STRIP.AUTO: NEGATIVE
HOLD SPECIMEN: NORMAL
HOLD SPECIMEN: NORMAL
IMM GRANULOCYTES # BLD AUTO: 0.06 10*3/MM3 (ref 0–0.05)
IMM GRANULOCYTES NFR BLD AUTO: 0.5 % (ref 0–0.5)
INTERNAL NEGATIVE CONTROL: ABNORMAL
INTERNAL POSITIVE CONTROL: ABNORMAL
KETONES UR QL STRIP: NEGATIVE
LEUKOCYTE ESTERASE UR QL STRIP.AUTO: NEGATIVE
LIPASE SERPL-CCNC: 25 U/L (ref 13–60)
LYMPHOCYTES # BLD AUTO: 1.91 10*3/MM3 (ref 0.7–3.1)
LYMPHOCYTES NFR BLD AUTO: 15.3 % (ref 19.6–45.3)
Lab: ABNORMAL
MCH RBC QN AUTO: 29.6 PG (ref 26.6–33)
MCHC RBC AUTO-ENTMCNC: 31.3 G/DL (ref 31.5–35.7)
MCV RBC AUTO: 94.6 FL (ref 79–97)
MONOCYTES # BLD AUTO: 0.57 10*3/MM3 (ref 0.1–0.9)
MONOCYTES NFR BLD AUTO: 4.6 % (ref 5–12)
NEUTROPHILS NFR BLD AUTO: 79.2 % (ref 42.7–76)
NEUTROPHILS NFR BLD AUTO: 9.89 10*3/MM3 (ref 1.7–7)
NITRITE UR QL STRIP: NEGATIVE
NRBC BLD AUTO-RTO: 0 /100 WBC (ref 0–0.2)
PH UR STRIP.AUTO: 7 [PH] (ref 5–8)
PLATELET # BLD AUTO: 340 10*3/MM3 (ref 140–450)
PMV BLD AUTO: 9.8 FL (ref 6–12)
POTASSIUM SERPL-SCNC: 4.8 MMOL/L (ref 3.5–5.2)
PROT SERPL-MCNC: 7.8 G/DL (ref 6–8.5)
PROT UR QL STRIP: NEGATIVE
RBC # BLD AUTO: 4.96 10*6/MM3 (ref 3.77–5.28)
SODIUM SERPL-SCNC: 141 MMOL/L (ref 136–145)
SP GR UR STRIP: 1.02 (ref 1–1.03)
UROBILINOGEN UR QL STRIP: NORMAL
WBC # BLD AUTO: 12.49 10*3/MM3 (ref 3.4–10.8)
WHOLE BLOOD HOLD SPECIMEN: NORMAL

## 2021-06-25 PROCEDURE — 93976 VASCULAR STUDY: CPT

## 2021-06-25 PROCEDURE — 99283 EMERGENCY DEPT VISIT LOW MDM: CPT

## 2021-06-25 PROCEDURE — 80053 COMPREHEN METABOLIC PANEL: CPT | Performed by: EMERGENCY MEDICINE

## 2021-06-25 PROCEDURE — 81025 URINE PREGNANCY TEST: CPT | Performed by: EMERGENCY MEDICINE

## 2021-06-25 PROCEDURE — 74176 CT ABD & PELVIS W/O CONTRAST: CPT

## 2021-06-25 PROCEDURE — 76830 TRANSVAGINAL US NON-OB: CPT

## 2021-06-25 PROCEDURE — 85025 COMPLETE CBC W/AUTO DIFF WBC: CPT | Performed by: EMERGENCY MEDICINE

## 2021-06-25 PROCEDURE — 81003 URINALYSIS AUTO W/O SCOPE: CPT | Performed by: EMERGENCY MEDICINE

## 2021-06-25 PROCEDURE — 83690 ASSAY OF LIPASE: CPT | Performed by: EMERGENCY MEDICINE

## 2021-06-25 RX ORDER — HYDROCODONE BITARTRATE AND ACETAMINOPHEN 5; 325 MG/1; MG/1
1 TABLET ORAL EVERY 6 HOURS PRN
Qty: 12 TABLET | Refills: 0 | Status: SHIPPED | OUTPATIENT
Start: 2021-06-25 | End: 2021-07-22

## 2021-06-25 RX ORDER — SODIUM CHLORIDE 9 MG/ML
10 INJECTION INTRAVENOUS AS NEEDED
Status: DISCONTINUED | OUTPATIENT
Start: 2021-06-25 | End: 2021-06-25 | Stop reason: HOSPADM

## 2021-07-22 ENCOUNTER — TELEMEDICINE (OUTPATIENT)
Dept: FAMILY MEDICINE CLINIC | Facility: TELEHEALTH | Age: 21
End: 2021-07-22

## 2021-07-22 DIAGNOSIS — L01.00 IMPETIGO: Primary | ICD-10-CM

## 2021-07-22 PROCEDURE — 99213 OFFICE O/P EST LOW 20 MIN: CPT | Performed by: NURSE PRACTITIONER

## 2021-07-22 NOTE — PROGRESS NOTES
SUE Kerr is a 21 y.o. female  presents with complaint of rash under nose for about a week. At first thought it was acne due to looking like it was a bunch of small white heads on one side of nose, but now it has went to both sides of nostrils. Has used acne treatment and rubbing alcohol but it will not go ahead.     When questioned about appearance, she did admit to it having some honey colored crusting.     Review of Systems   Constitutional: Negative for fever.   HENT: Negative for congestion, nosebleeds, postnasal drip, rhinorrhea and sore throat.    Respiratory: Negative for cough.    Cardiovascular: Negative for chest pain.   Gastrointestinal: Negative.    Skin: Positive for rash (under both nostrils).       Past Medical History:   Diagnosis Date   • PCOS (polycystic ovarian syndrome)        Family History   Problem Relation Age of Onset   • No Known Problems Mother    • Heart attack Father    • Colon cancer Father    • No Known Problems Sister    • No Known Problems Brother    • GI problems Maternal Grandmother         bowel obstruction   • Diabetes Maternal Grandfather    • Heart disease Paternal Grandmother    • Diabetes Paternal Grandfather    • Cancer Paternal Grandfather    • No Known Problems Brother    • No Known Problems Sister        Social History     Socioeconomic History   • Marital status: Single     Spouse name: Not on file   • Number of children: Not on file   • Years of education: Not on file   • Highest education level: Not on file   Tobacco Use   • Smoking status: Light Tobacco Smoker   • Smokeless tobacco: Current User   Substance and Sexual Activity   • Alcohol use: No   • Drug use: No   • Sexual activity: Yes     Partners: Male     Birth control/protection: OCP         There were no vitals taken for this visit.    PHYSICAL EXAM  Physical Exam   Constitutional: She appears well-developed and well-nourished.   HENT:   Head: Normocephalic.       Nose: Nose normal.   Neck: Neck normal  appearance.  Pulmonary/Chest: Effort normal.   Neurological: She is alert.   Psychiatric: She has a normal mood and affect. Her speech is normal.       Diagnoses and all orders for this visit:    1. Impetigo (Primary)  -     mupirocin (Bactroban) 2 % ointment; Apply  topically to the appropriate area as directed 3 (Three) Times a Day for 7 days.  Dispense: 30 g; Refill: 0      **Can make new virtual care appt in 3-4 days if no improvement for f/u.     FOLLOW-UP  As discussed during visit with Ubi, if symptoms worsen or fail to improve, follow-up with PCP/Urgent Care/Emergency Department.    Patient verbalizes understanding of medications, instructions for treatment and follow-up.    Cheri Mujica, YAKELIN  07/22/2021  17:23 EDT    This visit was performed via Telehealth.  This patient has been instructed to follow-up with their primary care provider if their symptoms worsen or the treatment provided does not resolve their illness.

## 2021-08-07 ENCOUNTER — TELEMEDICINE (OUTPATIENT)
Dept: FAMILY MEDICINE CLINIC | Facility: TELEHEALTH | Age: 21
End: 2021-08-07

## 2021-08-07 VITALS — HEIGHT: 67 IN | BODY MASS INDEX: 40.81 KG/M2 | WEIGHT: 260 LBS

## 2021-08-07 DIAGNOSIS — L01.00 IMPETIGO: Primary | ICD-10-CM

## 2021-08-07 PROBLEM — I10 ESSENTIAL HYPERTENSION: Status: RESOLVED | Noted: 2019-06-12 | Resolved: 2021-08-07

## 2021-08-07 PROCEDURE — 99213 OFFICE O/P EST LOW 20 MIN: CPT | Performed by: NURSE PRACTITIONER

## 2021-08-07 RX ORDER — DOXYCYCLINE 100 MG/1
100 CAPSULE ORAL 2 TIMES DAILY
Qty: 10 CAPSULE | Refills: 0 | Status: SHIPPED | OUTPATIENT
Start: 2021-08-07 | End: 2021-08-12

## 2021-08-07 NOTE — PROGRESS NOTES
"CHIEF COMPLAINT  Cc: rash    HPI  Britta Kerr is a 21 y.o. female  presents with complaint of a rash under her nose. She was seen via tele health on 07/22 and the diagnosis was  impetigo. She ws treated with mupirocin ointment which is helping but the it has not gone away. The drainage has decreased but the red spots are still there. She has had this now for about 3.5 weeks. She pouts the mupirocin on as directed. 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    Review of Systems   Constitutional: Negative for fever.   HENT: Negative for congestion and sore throat.    Respiratory: Negative for cough, shortness of breath and wheezing.    Cardiovascular: Negative for chest pain.   Gastrointestinal: Negative for diarrhea, nausea and vomiting.   Musculoskeletal: Negative for myalgias.   Skin: Positive for rash (red rash under nose on both sides).   Neurological: Negative for headaches.       Past Medical History:   Diagnosis Date   • PCOS (polycystic ovarian syndrome)        Family History   Problem Relation Age of Onset   • No Known Problems Mother    • Heart attack Father    • Colon cancer Father    • No Known Problems Sister    • No Known Problems Brother    • GI problems Maternal Grandmother         bowel obstruction   • Diabetes Maternal Grandfather    • Heart disease Paternal Grandmother    • Diabetes Paternal Grandfather    • Cancer Paternal Grandfather    • No Known Problems Brother    • No Known Problems Sister        Social History     Socioeconomic History   • Marital status: Single     Spouse name: Not on file   • Number of children: Not on file   • Years of education: Not on file   • Highest education level: Not on file   Tobacco Use   • Smoking status: Light Tobacco Smoker   • Smokeless tobacco: Current User   Substance and Sexual Activity   • Alcohol use: No   • Drug use: No   • Sexual activity: Yes     Partners: Male     Birth control/protection: OCP         Ht 170.2 cm (67\")   " Wt 118 kg (260 lb)   Breastfeeding No   BMI 40.72 kg/m²     PHYSICAL EXAM  Physical Exam   Constitutional: She is oriented to person, place, and time. She appears well-developed and well-nourished.   HENT:   Head: Normocephalic and atraumatic.   Right Ear: Hearing and external ear normal.   Left Ear: Hearing and external ear normal.   Nose: Nose normal.   Mouth/Throat: Mouth/Lips are normal.  Eyes: Lids are normal. Right eye exhibits no discharge and no exudate. Left eye exhibits no discharge and no exudate. Right conjunctiva is not injected. Left conjunctiva is not injected.   Pulmonary/Chest: No accessory muscle usage. No tachypnea and no bradypnea.  No respiratory distress.No use of oxygen by nasal cannulaNo use of oxygen by mask noted.  Abdominal: Abdomen appears normal.   Neurological: She is alert and oriented to person, place, and time. No cranial nerve deficit.   Skin: Rash (erythematous lesions below nostrils bilaterally) noted.   Psychiatric: She has a normal mood and affect. Her speech is normal and behavior is normal. Judgment and thought content normal.   Vitals reviewed.      Results for orders placed or performed during the hospital encounter of 06/25/21   Comprehensive Metabolic Panel    Specimen: Blood   Result Value Ref Range    Glucose 97 65 - 99 mg/dL    BUN 16 6 - 20 mg/dL    Creatinine 0.76 0.57 - 1.00 mg/dL    Sodium 141 136 - 145 mmol/L    Potassium 4.8 3.5 - 5.2 mmol/L    Chloride 105 98 - 107 mmol/L    CO2 27.0 22.0 - 29.0 mmol/L    Calcium 10.2 8.6 - 10.5 mg/dL    Total Protein 7.8 6.0 - 8.5 g/dL    Albumin 5.00 3.50 - 5.20 g/dL    ALT (SGPT) 23 1 - 33 U/L    AST (SGOT) 20 1 - 32 U/L    Alkaline Phosphatase 64 39 - 117 U/L    Total Bilirubin 0.3 0.0 - 1.2 mg/dL    eGFR Non African Amer 95 >60 mL/min/1.73    Globulin 2.8 gm/dL    A/G Ratio 1.8 g/dL    BUN/Creatinine Ratio 21.1 7.0 - 25.0    Anion Gap 9.0 5.0 - 15.0 mmol/L   Lipase    Specimen: Blood   Result Value Ref Range    Lipase 25  13 - 60 U/L   Urinalysis With Microscopic If Indicated (No Culture) - Urine, Clean Catch    Specimen: Urine, Clean Catch   Result Value Ref Range    Color, UA Yellow Yellow, Straw    Appearance, UA Clear Clear    pH, UA 7.0 5.0 - 8.0    Specific Gravity, UA 1.016 1.001 - 1.030    Glucose, UA Negative Negative    Ketones, UA Negative Negative    Bilirubin, UA Negative Negative    Blood, UA Negative Negative    Protein, UA Negative Negative    Leuk Esterase, UA Negative Negative    Nitrite, UA Negative Negative    Urobilinogen, UA 0.2 E.U./dL 0.2 - 1.0 E.U./dL   CBC Auto Differential    Specimen: Blood   Result Value Ref Range    WBC 12.49 (H) 3.40 - 10.80 10*3/mm3    RBC 4.96 3.77 - 5.28 10*6/mm3    Hemoglobin 14.7 12.0 - 15.9 g/dL    Hematocrit 46.9 (H) 34.0 - 46.6 %    MCV 94.6 79.0 - 97.0 fL    MCH 29.6 26.6 - 33.0 pg    MCHC 31.3 (L) 31.5 - 35.7 g/dL    RDW 12.3 12.3 - 15.4 %    RDW-SD 42.5 37.0 - 54.0 fl    MPV 9.8 6.0 - 12.0 fL    Platelets 340 140 - 450 10*3/mm3    Neutrophil % 79.2 (H) 42.7 - 76.0 %    Lymphocyte % 15.3 (L) 19.6 - 45.3 %    Monocyte % 4.6 (L) 5.0 - 12.0 %    Eosinophil % 0.2 (L) 0.3 - 6.2 %    Basophil % 0.2 0.0 - 1.5 %    Immature Grans % 0.5 0.0 - 0.5 %    Neutrophils, Absolute 9.89 (H) 1.70 - 7.00 10*3/mm3    Lymphocytes, Absolute 1.91 0.70 - 3.10 10*3/mm3    Monocytes, Absolute 0.57 0.10 - 0.90 10*3/mm3    Eosinophils, Absolute 0.03 0.00 - 0.40 10*3/mm3    Basophils, Absolute 0.03 0.00 - 0.20 10*3/mm3    Immature Grans, Absolute 0.06 (H) 0.00 - 0.05 10*3/mm3    nRBC 0.0 0.0 - 0.2 /100 WBC   POC Pregnancy, Urine    Specimen: Urine   Result Value Ref Range    HCG, Urine, QL Negative Negative    Lot Number [id number]     Internal Positive Control Passed Passed    Internal Negative Control Failed (A) Passed   Green Top (Gel)   Result Value Ref Range    Extra Tube Hold for add-ons.    Lavender Top   Result Value Ref Range    Extra Tube hold for add-on    Gold Top - SST   Result Value Ref Range     Extra Tube Hold for add-ons.        Diagnoses and all orders for this visit:    1. Impetigo (Primary)    Other orders  -     doxycycline (MONODOX) 100 MG capsule; Take 1 capsule by mouth 2 (Two) Times a Day for 5 days.  Dispense: 10 capsule; Refill: 0    Clean lesions with clean cloth and mild soap and water  May continue mupirocin twice daily  Doxycycline on an empty stomach with a full glass of water. Do not take minerals or vitamins with minerals with this antibiotic as it decreases the therapeutic value of this antibiotic related to absorption.  Probiotics for two weeks related to taking antibiotics. The pharmacist can help you with this if needed. Do not take within two hours of antibiotic.    FOLLOW-UP  If symptoms worsen or persist follow up with PCP/dermatologist or Urgent Care fo in person evaluation    COVID-19  if at any time you experience fever AND/OR cough AND/OR shortness of breath AND/OR loss of sense of taste or smell you are being advised to go to nearest urgent care or emergency department for evaluation AND/OR testing    Patient verbalizes understanding of medication dosage, comfort measures, instructions for treatment and follow-up.    YAKELIN Pandya  08/07/2021  12:13 EDT    This visit was performed via Telehealth.  This patient has been instructed to follow-up with their primary care provider if their symptoms worsen or the treatment provided does not resolve their illness.

## 2021-12-31 PROCEDURE — U0004 COV-19 TEST NON-CDC HGH THRU: HCPCS | Performed by: NURSE PRACTITIONER

## 2022-01-08 ENCOUNTER — TELEMEDICINE (OUTPATIENT)
Dept: FAMILY MEDICINE CLINIC | Facility: TELEHEALTH | Age: 22
End: 2022-01-08

## 2022-01-08 DIAGNOSIS — R21 RASH OF HANDS: Primary | ICD-10-CM

## 2022-01-08 PROCEDURE — 99213 OFFICE O/P EST LOW 20 MIN: CPT | Performed by: NURSE PRACTITIONER

## 2022-01-08 NOTE — PATIENT INSTRUCTIONS
Symptom treatment. May last 5-10 days and if it last longer or you develop rash other places follow up  If this is viral, you may develop lesions on your feet and inside your mouth.     Hand, Foot, and Mouth Disease, Adult  Hand, foot, and mouth disease is a common viral illness. It happens mainly in children who are younger than 10 years old, but adolescents and adults may also get it. The illness often causes:  · Sore throat.  · Sores in the mouth.  · Fever.  · Rash on the hands and feet.  Usually, this condition is not serious. Most people get better within 1-2 weeks.  What are the causes?  This condition is usually caused by a group of viruses called enteroviruses. The disease can spread from person to person (is contagious). A person is most contagious during the first week of the illness. The infection spreads through direct contact with:  · Nose discharge of an infected person.  · Throat discharge of an infected person.  · Stool (feces) of an infected person.  What are the signs or symptoms?  Symptoms of this condition include:  · Small sores in the mouth. These may cause pain.  · A rash on the hands and feet, and sometimes on the buttocks. The rash may also occur on the arms, legs, or other areas of the body. The rash may look like small red bumps or sores and may have blisters.  · Fever.  · Body aches or headaches.  · Irritability.  · Decreased appetite.  How is this diagnosed?  This condition can usually be diagnosed with a physical exam in which your health care provider will look at your rash and mouth sores. Tests are usually not needed. In some cases, a stool (feces) sample or a throat swab may be taken to check for the virus or for other infections.  How is this treated?  In most cases, no treatment is needed. People usually get better within 2 weeks without treatment. To help relieve pain or fever, your health care provider may recommend over-the-counter medicines such as ibuprofen or acetaminophen.  To help relieve discomfort from mouth sores, your health care provider may recommend using:  · Solutions that are rinsed in the mouth.  · Pain-relieving gel that is applied to the sores (topical gel).  · Antacid medicine.  Follow these instructions at home:  Managing pain and discomfort  · Rinse your mouth with a salt-water mixture 3-4 times a day or as needed. To make a salt-water mixture, completely dissolve ½-1 tsp of salt in 1 cup of warm water. This can help to reduce pain from the mouth sores. Your health care provider may also recommend other rinse solutions to treat mouth sores.  · To relieve discomfort when you are eating:  ? Try combinations of foods to see what you can tolerate. Aim for a balanced diet.  ? Eat soft foods. These may be easier to swallow.  ? Avoid foods and drinks that are salty, spicy, or acidic.  ? Avoid alcohol.  ? Try cold food and drinks, such as water, milk, milkshakes, frozen ice pops, slushies, and sherbets. Low-calorie sport drinks are good choices for staying hydrated.  General instructions    · Return to your normal activities as told by your health care provider. Ask your health care provider what activities are safe for you.  · Take or apply over-the-counter and prescription medicines only as told by your health care provider.  · Wash your hands often with soap and water. If soap and water are not available, use hand .  · Stay away from work, schools, or other group settings during the first few days of the illness, or until your fever is gone.  · Keep all follow-up visits as told by your health care provider. This is important.    Contact a health care provider if:  · Your symptoms get worse or do not improve within 2 weeks.  · You have pain that does not get better with medicine.  · You feel very irritable.  · You have trouble swallowing.  · You develop sores or blisters on your lips or outside of your mouth.  · You have a fever for more than 3 days.  Get help right  away if:  · You develop signs of severe dehydration, such as:  ? Decreased urination. This means urinating only very small amounts or urinating fewer than 3 times in a 24-hour period.  ? Urine that is very dark.  ? Dry mouth, tongue, or lips.  ? Decreased tears or sunken eyes.  ? Dry skin.  ? Rapid breathing.  ? Decreased activity or being very sleepy.  ? Pale skin.  ? Fingertips taking longer than 2 seconds to turn pink after a gentle squeeze.  ? Weight loss.  · You have a severe headache.  · You have a stiff neck.  · You experience changes in your behavior.  · You have chest pain.  · You have trouble breathing.  Summary  · Hand, foot, and mouth disease is a common viral illness.  · This disease can spread from person to person (is contagious).  · The illness often causes a sore throat, sores in the mouth, fever, and a rash on the hands and feet.  · Typically, no treatment is needed for this condition. People usually get better within 2 weeks without treatment.  · Get help right away if you develop signs of severe dehydration.  This information is not intended to replace advice given to you by your health care provider. Make sure you discuss any questions you have with your health care provider.  Document Revised: 04/09/2020 Document Reviewed: 02/05/2018  [employer] Patient Education © 2021 [employer]      Atopic Dermatitis  Atopic dermatitis is a skin disorder that causes inflammation of the skin. This is the most common type of eczema. Eczema is a group of skin conditions that cause the skin to be itchy, red, and swollen. This condition is generally worse during the cooler winter months and often improves during the warm summer months. Symptoms can vary from person to person.  Atopic dermatitis usually starts showing signs in infancy and can last through adulthood. This condition cannot be passed from one person to another (non-contagious), but it is more common in families. Atopic dermatitis may not always be  present. When it is present, it is called a flare-up.  What are the causes?  The exact cause of this condition is not known. Flare-ups of the condition may be triggered by:  · Contact with something that you are sensitive or allergic to.  · Stress.  · Certain foods.  · Extremely hot or cold weather.  · Harsh chemicals and soaps.  · Dry air.  · Chlorine.  What increases the risk?  This condition is more likely to develop in people who have a personal history or family history of eczema, allergies, asthma, or hay fever.  What are the signs or symptoms?  Symptoms of this condition include:  · Dry, scaly skin.  · Red, itchy rash.  · Itchiness, which can be severe. This may occur before the skin rash. This can make sleeping difficult.  · Skin thickening and cracking that can occur over time.  How is this diagnosed?  This condition is diagnosed based on your symptoms, a medical history, and a physical exam.  How is this treated?  There is no cure for this condition, but symptoms can usually be controlled. Treatment focuses on:  · Controlling the itchiness and scratching. You may be given medicines, such as antihistamines or steroid creams.  · Limiting exposure to things that you are sensitive or allergic to (allergens).  · Recognizing situations that cause stress and developing a plan to manage stress.  If your atopic dermatitis does not get better with medicines, or if it is all over your body (widespread), a treatment using a specific type of light (phototherapy) may be used.  Follow these instructions at home:  Skin care    · Keep your skin well-moisturized. Doing this seals in moisture and helps to prevent dryness.  ? Use unscented lotions that have petroleum in them.  ? Avoid lotions that contain alcohol or water. They can dry the skin.  · Keep baths or showers short (less than 5 minutes) in warm water. Do not use hot water.  ? Use mild, unscented cleansers for bathing. Avoid soap and bubble bath.  ? Apply a  moisturizer to your skin right after a bath or shower.  · Do not apply anything to your skin without checking with your health care provider.    General instructions  · Dress in clothes made of cotton or cotton blends. Dress lightly because heat increases itchiness.  · When washing your clothes, rinse your clothes twice so all of the soap is removed.  · Avoid any triggers that can cause a flare-up.  · Try to manage your stress.  · Keep your fingernails cut short.  · Avoid scratching. Scratching makes the rash and itchiness worse. It may also result in a skin infection (impetigo) due to a break in the skin caused by scratching.  · Take or apply over-the-counter and prescription medicines only as told by your health care provider.  · Keep all follow-up visits as told by your health care provider. This is important.  · Do not be around people who have cold sores or fever blisters. If you get the infection, it may cause your atopic dermatitis to worsen.  Contact a health care provider if:  · Your itchiness interferes with sleep.  · Your rash gets worse or it is not better within one week of starting treatment.  · You have a fever.  · You have a rash flare-up after having contact with someone who has cold sores or fever blisters.  Get help right away if:  · You develop pus or soft yellow scabs in the rash area.  Summary  · This condition causes a red rash and itchy, dry, scaly skin.  · Treatment focuses on controlling the itchiness and scratching, limiting exposure to things that you are sensitive or allergic to (allergens), recognizing situations that cause stress, and developing a plan to manage stress.  · Keep your skin well-moisturized.  · Keep baths or showers shorter than 5 minutes and use warm water. Do not use hot water.  This information is not intended to replace advice given to you by your health care provider. Make sure you discuss any questions you have with your health care provider.  Document Revised:  04/07/2020 Document Reviewed: 01/19/2018  [employer] Patient Education © 2021 [employer]

## 2022-01-08 NOTE — PROGRESS NOTES
You have chosen to receive care through a telehealth visit.  Do you consent to use a video/audio connection for your medical care today? Yes     CHIEF COMPLAINT  Chief Complaint   Patient presents with   • Rash         HPI  Britta Kerr is a 21 y.o. female  presents with complaint of papules on her hands. She had a sore throat and fever on 1/1/2022 and then started breaking out on her hands on Thursday. She has been using benadryl cream to help the itching.     Review of Systems    Past Medical History:   Diagnosis Date   • PCOS (polycystic ovarian syndrome)        Family History   Problem Relation Age of Onset   • No Known Problems Mother    • Heart attack Father    • Colon cancer Father    • No Known Problems Sister    • No Known Problems Brother    • GI problems Maternal Grandmother         bowel obstruction   • Diabetes Maternal Grandfather    • Heart disease Paternal Grandmother    • Diabetes Paternal Grandfather    • Cancer Paternal Grandfather    • No Known Problems Brother    • No Known Problems Sister        Social History     Socioeconomic History   • Marital status: Single   Tobacco Use   • Smoking status: Light Tobacco Smoker   • Smokeless tobacco: Current User   Substance and Sexual Activity   • Alcohol use: No   • Drug use: No   • Sexual activity: Yes     Partners: Male     Birth control/protection: OCP         There were no vitals taken for this visit.    PHYSICAL EXAM  Physical Exam   Constitutional: She is oriented to person, place, and time. She does not have a sickly appearance. She does not appear ill. No distress. She appears overweight.  HENT:   Head: Normocephalic and atraumatic.   Eyes: EOM are normal.   Neck: Neck normal appearance.  Pulmonary/Chest: Effort normal.  No respiratory distress.  Neurological: She is alert and oriented to person, place, and time.   Skin: Skin is dry.   Papular rash on palms of hands. Scattered with some erythema.    Psychiatric: She has a normal mood and affect.          Diagnoses and all orders for this visit:    1. Rash of hands (Primary)    Other orders  -     triamcinolone (KENALOG) 0.1 % ointment; Apply 1 application topically to the appropriate area as directed 2 (Two) Times a Day. To hand lesions for comfort  Dispense: 15 g; Refill: 0    viral vs allergic rash. She did have a fever and sore throat several days before rash. Only on the palms of hands bilaterally and are itchy and painful.       FOLLOW-UP  As discussed during visit with PCP/[facility] if no improvement or Urgent Care/Emergency Department if worsening of symptoms    Patient verbalizes understanding of medication dosage, comfort measures, instructions for treatment and follow-up.    Mela Patricio, YAKELIN  01/08/2022  09:05 EST    This visit was performed via Telehealth.  This patient has been instructed to follow-up with their primary care provider if their symptoms worsen or the treatment provided does not resolve their illness.

## 2022-12-20 PROCEDURE — U0004 COV-19 TEST NON-CDC HGH THRU: HCPCS | Performed by: NURSE PRACTITIONER

## 2023-06-13 ENCOUNTER — HOSPITAL ENCOUNTER (EMERGENCY)
Facility: HOSPITAL | Age: 23
Discharge: HOME | End: 2023-06-13
Attending: EMERGENCY MEDICINE | Admitting: EMERGENCY MEDICINE
Payer: COMMERCIAL

## 2023-06-13 ENCOUNTER — APPOINTMENT (OUTPATIENT)
Dept: ULTRASOUND IMAGING | Facility: HOSPITAL | Age: 23
End: 2023-06-13
Payer: COMMERCIAL

## 2023-06-13 VITALS
TEMPERATURE: 98.6 F | HEART RATE: 80 BPM | DIASTOLIC BLOOD PRESSURE: 76 MMHG | BODY MASS INDEX: 42.38 KG/M2 | HEIGHT: 67 IN | RESPIRATION RATE: 18 BRPM | OXYGEN SATURATION: 99 % | WEIGHT: 270 LBS | SYSTOLIC BLOOD PRESSURE: 133 MMHG

## 2023-06-13 DIAGNOSIS — O20.0 THREATENED ABORTION: Primary | ICD-10-CM

## 2023-06-13 LAB
ABO GROUP BLD: NORMAL
BASOPHILS # BLD AUTO: 0.05 10*3/MM3 (ref 0–0.2)
BASOPHILS NFR BLD AUTO: 0.4 % (ref 0–1.5)
DEPRECATED RDW RBC AUTO: 41.1 FL (ref 37–54)
EOSINOPHIL # BLD AUTO: 0.03 10*3/MM3 (ref 0–0.4)
EOSINOPHIL NFR BLD AUTO: 0.3 % (ref 0.3–6.2)
ERYTHROCYTE [DISTWIDTH] IN BLOOD BY AUTOMATED COUNT: 12 % (ref 12.3–15.4)
HCG INTACT+B SERPL-ACNC: 11.78 MIU/ML
HCT VFR BLD AUTO: 44 % (ref 34–46.6)
HGB BLD-MCNC: 13.9 G/DL (ref 12–15.9)
HOLD SPECIMEN: NORMAL
IMM GRANULOCYTES # BLD AUTO: 0.03 10*3/MM3 (ref 0–0.05)
IMM GRANULOCYTES NFR BLD AUTO: 0.3 % (ref 0–0.5)
LYMPHOCYTES # BLD AUTO: 2.52 10*3/MM3 (ref 0.7–3.1)
LYMPHOCYTES NFR BLD AUTO: 22.2 % (ref 19.6–45.3)
MCH RBC QN AUTO: 29.3 PG (ref 26.6–33)
MCHC RBC AUTO-ENTMCNC: 31.6 G/DL (ref 31.5–35.7)
MCV RBC AUTO: 92.8 FL (ref 79–97)
MONOCYTES # BLD AUTO: 0.63 10*3/MM3 (ref 0.1–0.9)
MONOCYTES NFR BLD AUTO: 5.6 % (ref 5–12)
NEUTROPHILS NFR BLD AUTO: 71.2 % (ref 42.7–76)
NEUTROPHILS NFR BLD AUTO: 8.09 10*3/MM3 (ref 1.7–7)
NRBC BLD AUTO-RTO: 0 /100 WBC (ref 0–0.2)
NUMBER OF DOSES: NORMAL
PLATELET # BLD AUTO: 328 10*3/MM3 (ref 140–450)
PMV BLD AUTO: 9.5 FL (ref 6–12)
RBC # BLD AUTO: 4.74 10*6/MM3 (ref 3.77–5.28)
RH BLD: POSITIVE
WBC NRBC COR # BLD: 11.35 10*3/MM3 (ref 3.4–10.8)
WHOLE BLOOD HOLD COAG: NORMAL
WHOLE BLOOD HOLD SPECIMEN: NORMAL

## 2023-06-13 PROCEDURE — 85025 COMPLETE CBC W/AUTO DIFF WBC: CPT | Performed by: EMERGENCY MEDICINE

## 2023-06-13 PROCEDURE — 84702 CHORIONIC GONADOTROPIN TEST: CPT | Performed by: EMERGENCY MEDICINE

## 2023-06-13 PROCEDURE — 99282 EMERGENCY DEPT VISIT SF MDM: CPT

## 2023-06-13 PROCEDURE — 86900 BLOOD TYPING SEROLOGIC ABO: CPT | Performed by: EMERGENCY MEDICINE

## 2023-06-13 PROCEDURE — 86901 BLOOD TYPING SEROLOGIC RH(D): CPT | Performed by: EMERGENCY MEDICINE

## 2023-06-13 PROCEDURE — 36415 COLL VENOUS BLD VENIPUNCTURE: CPT

## 2023-06-13 PROCEDURE — 76817 TRANSVAGINAL US OBSTETRIC: CPT

## 2023-06-13 RX ORDER — SODIUM CHLORIDE 0.9 % (FLUSH) 0.9 %
10 SYRINGE (ML) INJECTION AS NEEDED
Status: DISCONTINUED | OUTPATIENT
Start: 2023-06-13 | End: 2023-06-13 | Stop reason: HOSPADM

## 2023-06-13 NOTE — Clinical Note
[facility] EMERGENCY DEPARTMENT  [address] [address]  Phone: [phone]    Britta Kerr was seen and treated in our emergency department on 6/13/2023.  She may return to work on 06/16/2023.         Thank you for choosing [facility].    Rashad Enamorado PA

## 2023-06-13 NOTE — ED PROVIDER NOTES
Subjective   History of Present Illness  23-year-old female presents emergency department today with bleeding during pregnancy.  She states she is about 5 weeks pregnant.  She has not had an ultrasound up to this point.  She reports that she is woke up this morning having a vaginal bleeding.  She reports she is not been through even 1 pad as of yet but states that the bleeding she would compared to a normal period.  She is G1, P0 Ab0.  She has no other chronic medical disease problems other than polycystic ovarian disease.    History provided by:  Patient   used: No    Vaginal Bleeding - Pregnant  Quality:  Bright red  Severity:  Moderate  Onset quality:  Sudden  Duration: Began sometime last night has almost stopped bleeding at this point.  Timing:  Constant  Progression:  Partially resolved  Chronicity:  New  Prior pregnancy: no    Pregnancy confirmed by ultrasound: no    Gestational age:  4  Prenatal care:  No prenatal care  Number of pads used:  1  Context: spontaneously    Relieved by:  Nothing  Worsened by:  Nothing  Ineffective treatments:  None tried  Associated symptoms: no abdominal pain, no back pain, no dizziness, no dyspareunia, no dysuria, no fatigue, no fever, no nausea and no vaginal discharge    Risk factors: unprotected sex    Risk factors: no bleeding disorder, no hx of endometriosis, does not have multiple partners, no ovarian cysts, no prior miscarriage, no STD exposure and no terminated pregnancy      Review of Systems   Constitutional:  Negative for chills, fatigue and fever.   Respiratory:  Negative for chest tightness and stridor.    Cardiovascular:  Negative for chest pain and palpitations.   Gastrointestinal:  Negative for abdominal pain and nausea.   Genitourinary:  Negative for dyspareunia, dysuria and vaginal discharge.   Musculoskeletal:  Negative for back pain.   Skin:  Negative for pallor and rash.   Neurological:  Negative for dizziness.   Psychiatric/Behavioral:  Negative.     All other systems reviewed and are negative.    Past Medical History:   Diagnosis Date    PCOS (polycystic ovarian syndrome)        Allergies   Allergen Reactions    Amoxicillin Rash       History reviewed. No pertinent surgical history.    Family History   Problem Relation Age of Onset    No Known Problems Mother     Heart attack Father     Colon cancer Father     No Known Problems Sister     No Known Problems Brother     GI problems Maternal Grandmother         bowel obstruction    Diabetes Maternal Grandfather     Heart disease Paternal Grandmother     Diabetes Paternal Grandfather     Cancer Paternal Grandfather     No Known Problems Brother     No Known Problems Sister        Social History     Socioeconomic History    Marital status:    Tobacco Use    Smoking status: Light Smoker    Smokeless tobacco: Current   Vaping Use    Vaping Use: Never used   Substance and Sexual Activity    Alcohol use: No    Drug use: No    Sexual activity: Yes     Partners: Male     Birth control/protection: OCP           Objective   Physical Exam  Vitals and nursing note reviewed.   Constitutional:       General: She is not in acute distress.     Appearance: She is well-developed. She is not diaphoretic.   HENT:      Head: Normocephalic and atraumatic.      Nose: Nose normal.   Eyes:      General: No scleral icterus.     Conjunctiva/sclera: Conjunctivae normal.   Pulmonary:      Effort: Pulmonary effort is normal. No respiratory distress.   Abdominal:      Tenderness: There is no abdominal tenderness.   Musculoskeletal:         General: Normal range of motion.      Cervical back: Normal range of motion and neck supple.   Skin:     General: Skin is warm and dry.   Neurological:      Mental Status: She is alert and oriented to person, place, and time.   Psychiatric:         Behavior: Behavior normal.       Procedures           ED Course                No results found for this or any previous visit (from the past  "24 hour(s)).    Note: In addition to lab results from this visit, the labs listed above may include labs taken at another facility or during a different encounter within the last 24 hours. Please correlate lab times with ED admission and discharge times for further clarification of the services performed during this visit.    US Ob Transvaginal   Final Result      1. There is no evidence of an intrauterine pregnancy.      2. Trace amount of fluid within the endocervical canal, nonspecific      3. Hypoechoic structure within the right ovary which is nonspecific. Findings favored represent a corpus luteal cyst or hemorrhagic cyst. Ectopic pregnancy is not seen but is not excluded on this study. Please correlate with quantitative beta hCG level          Electronically Signed: Kristofer Childress     6/13/2023 10:08 AM EDT     Workstation ID: [id number]        Vitals:    06/13/23 0647   BP: 133/76   BP Location: Left arm   Patient Position: Sitting   Pulse: 80   Resp: 18   Temp: 98.6 °F (37 °C)   TempSrc: Oral   SpO2: 99%   Weight: 122 kg (270 lb)   Height: 170.2 cm (67\")     Medications - No data to display    ECG/EMG Results (last 24 hours)       ** No results found for the last 24 hours. **          No orders to display                                  Medical Decision Making  Patient thought she was about 5 weeks pregnant by dates.  She began having some vaginal bleeding earlier this morning.  Is almost stopped at this point.  Quantitative hCG was on the 12th.  Ultrasound was negative for intrauterine pregnancy and had what look like a corpus luteal cyst.  I discussed this with Dr. Stout her OB/GYN.  She asked her to call the office to get an late this week or early next week for repeat ultrasound and quantitative hCG.  We discussed the findings with Ms. Cirilo.  She will return to the ER for bleeding greater than 2 pads an hour for 2 more consecutive hours increasing lower abdominal pain or any other problems or " concerns.    Problems Addressed:  Threatened : complicated acute illness or injury    Amount and/or Complexity of Data Reviewed  Labs: ordered. Decision-making details documented in ED Course.  Radiology: ordered and independent interpretation performed. Decision-making details documented in ED Course.    Risk  Prescription drug management.        Final diagnoses:   Threatened        ED Disposition  ED Disposition       ED Disposition   Discharge    Condition   Stable    Comment   --               Connie Stout MD  [address] [id number] [phone]      call for follow up         Medication List        Stop      azithromycin 250 MG tablet  Commonly known as: ZITHROMAX     methylPREDNISolone 4 MG dose pack  Commonly known as: MEDROL     triamcinolone 0.1 % ointment  Commonly known as: Rashad Roque PA  23

## 2025-08-22 ENCOUNTER — OFFICE VISIT (OUTPATIENT)
Dept: INTERNAL MEDICINE | Facility: CLINIC | Age: 25
End: 2025-08-22
Payer: COMMERCIAL

## 2025-08-22 VITALS
HEIGHT: 67 IN | WEIGHT: 293 LBS | RESPIRATION RATE: 16 BRPM | SYSTOLIC BLOOD PRESSURE: 118 MMHG | BODY MASS INDEX: 45.99 KG/M2 | HEART RATE: 80 BPM | DIASTOLIC BLOOD PRESSURE: 78 MMHG | TEMPERATURE: 97.5 F

## 2025-08-22 DIAGNOSIS — E66.01 MORBID OBESITY WITH BMI OF 40.0-44.9, ADULT: ICD-10-CM

## 2025-08-22 DIAGNOSIS — Z72.0 TOBACCO USE: ICD-10-CM

## 2025-08-22 DIAGNOSIS — R06.83 SNORING: ICD-10-CM

## 2025-08-22 DIAGNOSIS — E28.2 PCOS (POLYCYSTIC OVARIAN SYNDROME): ICD-10-CM

## 2025-08-22 DIAGNOSIS — Z00.00 ANNUAL PHYSICAL EXAM: Primary | ICD-10-CM

## 2025-08-22 DIAGNOSIS — Z23 ENCOUNTER FOR IMMUNIZATION: ICD-10-CM

## 2025-08-22 LAB
B-HCG UR QL: NEGATIVE
EXPIRATION DATE: NORMAL
INTERNAL NEGATIVE CONTROL: NEGATIVE
INTERNAL POSITIVE CONTROL: POSITIVE
Lab: NORMAL

## 2025-08-22 RX ORDER — DROSPIRENONE AND ETHINYL ESTRADIOL 0.02-3(28)
1 KIT ORAL DAILY
Qty: 28 TABLET | Refills: 12 | Status: SHIPPED | OUTPATIENT
Start: 2025-08-22

## 2025-08-27 DIAGNOSIS — Z13.1 SCREENING FOR DIABETES MELLITUS (DM): Primary | ICD-10-CM

## 2025-08-27 DIAGNOSIS — Z13.220 SCREENING FOR HYPERLIPIDEMIA: ICD-10-CM

## 2025-08-27 DIAGNOSIS — E66.01 MORBID OBESITY WITH BMI OF 40.0-44.9, ADULT: ICD-10-CM

## 2025-08-28 ENCOUNTER — TELEPHONE (OUTPATIENT)
Dept: INTERNAL MEDICINE | Facility: CLINIC | Age: 25
End: 2025-08-28
Payer: COMMERCIAL

## 2025-08-29 ENCOUNTER — LAB (OUTPATIENT)
Dept: INTERNAL MEDICINE | Facility: CLINIC | Age: 25
End: 2025-08-29
Payer: COMMERCIAL

## 2025-08-29 DIAGNOSIS — E66.01 MORBID OBESITY WITH BMI OF 40.0-44.9, ADULT: ICD-10-CM

## 2025-08-29 DIAGNOSIS — Z13.1 SCREENING FOR DIABETES MELLITUS (DM): ICD-10-CM

## 2025-08-29 DIAGNOSIS — Z13.220 SCREENING FOR HYPERLIPIDEMIA: ICD-10-CM

## 2025-08-29 LAB
ALBUMIN SERPL-MCNC: 4.2 G/DL (ref 3.5–5.2)
ALBUMIN/GLOB SERPL: 1.2 G/DL
ALP SERPL-CCNC: 54 U/L (ref 39–117)
ALT SERPL W P-5'-P-CCNC: 30 U/L (ref 1–33)
ANION GAP SERPL CALCULATED.3IONS-SCNC: 12.8 MMOL/L (ref 5–15)
AST SERPL-CCNC: 23 U/L (ref 1–32)
BASOPHILS # BLD AUTO: 0.04 10*3/MM3 (ref 0–0.2)
BASOPHILS NFR BLD AUTO: 0.5 % (ref 0–1.5)
BILIRUB SERPL-MCNC: 0.3 MG/DL (ref 0–1.2)
BUN SERPL-MCNC: 12 MG/DL (ref 6–20)
BUN/CREAT SERPL: 14.5 (ref 7–25)
CALCIUM SPEC-SCNC: 9.7 MG/DL (ref 8.6–10.5)
CHLORIDE SERPL-SCNC: 106 MMOL/L (ref 98–107)
CHOLEST SERPL-MCNC: 199 MG/DL (ref 0–200)
CO2 SERPL-SCNC: 22.2 MMOL/L (ref 22–29)
CREAT SERPL-MCNC: 0.83 MG/DL (ref 0.57–1)
DEPRECATED RDW RBC AUTO: 39.1 FL (ref 37–54)
EGFRCR SERPLBLD CKD-EPI 2021: 99.9 ML/MIN/1.73
EOSINOPHIL # BLD AUTO: 0.06 10*3/MM3 (ref 0–0.4)
EOSINOPHIL NFR BLD AUTO: 0.8 % (ref 0.3–6.2)
ERYTHROCYTE [DISTWIDTH] IN BLOOD BY AUTOMATED COUNT: 12.1 % (ref 12.3–15.4)
GLOBULIN UR ELPH-MCNC: 3.4 GM/DL
GLUCOSE SERPL-MCNC: 78 MG/DL (ref 65–99)
HBA1C MFR BLD: 5.4 % (ref 4.8–5.6)
HCT VFR BLD AUTO: 42.3 % (ref 34–46.6)
HDLC SERPL-MCNC: 40 MG/DL (ref 40–60)
HGB BLD-MCNC: 13.7 G/DL (ref 12–15.9)
IMM GRANULOCYTES # BLD AUTO: 0.02 10*3/MM3 (ref 0–0.05)
IMM GRANULOCYTES NFR BLD AUTO: 0.3 % (ref 0–0.5)
LDLC SERPL CALC-MCNC: 148 MG/DL (ref 0–100)
LDLC/HDLC SERPL: 3.67 {RATIO}
LYMPHOCYTES # BLD AUTO: 2.54 10*3/MM3 (ref 0.7–3.1)
LYMPHOCYTES NFR BLD AUTO: 34.3 % (ref 19.6–45.3)
MCH RBC QN AUTO: 28.8 PG (ref 26.6–33)
MCHC RBC AUTO-ENTMCNC: 32.4 G/DL (ref 31.5–35.7)
MCV RBC AUTO: 89.1 FL (ref 79–97)
MONOCYTES # BLD AUTO: 0.34 10*3/MM3 (ref 0.1–0.9)
MONOCYTES NFR BLD AUTO: 4.6 % (ref 5–12)
NEUTROPHILS NFR BLD AUTO: 4.4 10*3/MM3 (ref 1.7–7)
NEUTROPHILS NFR BLD AUTO: 59.5 % (ref 42.7–76)
NRBC BLD AUTO-RTO: 0 /100 WBC (ref 0–0.2)
PLATELET # BLD AUTO: 305 10*3/MM3 (ref 140–450)
PMV BLD AUTO: 10 FL (ref 6–12)
POTASSIUM SERPL-SCNC: 4.3 MMOL/L (ref 3.5–5.2)
PROT SERPL-MCNC: 7.6 G/DL (ref 6–8.5)
RBC # BLD AUTO: 4.75 10*6/MM3 (ref 3.77–5.28)
SODIUM SERPL-SCNC: 141 MMOL/L (ref 136–145)
TRIGL SERPL-MCNC: 62 MG/DL (ref 0–150)
TSH SERPL DL<=0.05 MIU/L-ACNC: 1.76 UIU/ML (ref 0.27–4.2)
VLDLC SERPL-MCNC: 11 MG/DL (ref 5–40)
WBC NRBC COR # BLD AUTO: 7.4 10*3/MM3 (ref 3.4–10.8)

## 2025-08-29 PROCEDURE — 80050 GENERAL HEALTH PANEL: CPT | Performed by: STUDENT IN AN ORGANIZED HEALTH CARE EDUCATION/TRAINING PROGRAM

## 2025-08-29 PROCEDURE — 83036 HEMOGLOBIN GLYCOSYLATED A1C: CPT | Performed by: STUDENT IN AN ORGANIZED HEALTH CARE EDUCATION/TRAINING PROGRAM

## 2025-08-29 PROCEDURE — 80061 LIPID PANEL: CPT | Performed by: STUDENT IN AN ORGANIZED HEALTH CARE EDUCATION/TRAINING PROGRAM
